# Patient Record
Sex: FEMALE | Race: WHITE | Employment: OTHER | URBAN - METROPOLITAN AREA
[De-identification: names, ages, dates, MRNs, and addresses within clinical notes are randomized per-mention and may not be internally consistent; named-entity substitution may affect disease eponyms.]

---

## 2020-06-06 ENCOUNTER — OFFICE VISIT (OUTPATIENT)
Dept: URGENT CARE | Facility: URGENT CARE | Age: 75
End: 2020-06-06
Payer: MEDICARE

## 2020-06-06 VITALS — HEART RATE: 81 BPM | DIASTOLIC BLOOD PRESSURE: 83 MMHG | SYSTOLIC BLOOD PRESSURE: 160 MMHG | TEMPERATURE: 98.7 F

## 2020-06-06 DIAGNOSIS — H10.33 ACUTE BACTERIAL CONJUNCTIVITIS OF BOTH EYES: Primary | ICD-10-CM

## 2020-06-06 DIAGNOSIS — R20.0 NUMBNESS OF RIGHT HAND: ICD-10-CM

## 2020-06-06 DIAGNOSIS — Z91.030 ALLERGY TO BEE STING: ICD-10-CM

## 2020-06-06 PROCEDURE — 99203 OFFICE O/P NEW LOW 30 MIN: CPT | Performed by: FAMILY MEDICINE

## 2020-06-06 RX ORDER — EPINEPHRINE 0.3 MG/.3ML
0.3 INJECTION SUBCUTANEOUS
Qty: 2 EACH | Refills: 1 | Status: SHIPPED | OUTPATIENT
Start: 2020-06-06

## 2020-06-06 RX ORDER — NEOMYCIN POLYMYXIN B SULFATES AND DEXAMETHASONE 3.5; 10000; 1 MG/ML; [USP'U]/ML; MG/ML
SUSPENSION/ DROPS OPHTHALMIC
Qty: 5 ML | Refills: 0 | Status: SHIPPED | OUTPATIENT
Start: 2020-06-06

## 2020-06-06 NOTE — PATIENT INSTRUCTIONS
Use eye drops as directed.    Take antihistamine like generic Benadryl or generic Claritin or generic Zyrtec.    Warm pack with warm wash cloth.    See Eye Doctor if not improving.    See your Doctor for a check.

## 2020-06-06 NOTE — PROGRESS NOTES
CHIEF COMPLAINT    Eyes red, mattering      HISTORY    Symptomatic for 4 days. Notices redness, white-yellow drainage, some lid swelling. milld tenderness.    Has h/o macular degeneration R eye.    Needs refill of Epi Pen. Used for insect sting reaction.    Also notices a feeling in R hand like a film.    Hasn't seen her doctor recently.      Patient Active Problem List   Diagnosis     Essential hypertension     Bilateral low back pain without sciatica     Blood glucose elevated     Hyperlipidemia LDL goal <130     Non morbid obesity, unspecified obesity type       Current Outpatient Medications   Medication Sig Dispense Refill     EPINEPHrine (EPIPEN 2-JACIEL) 0.3 MG/0.3ML injection 2-pack Inject 0.3 mLs (0.3 mg) into the muscle once as needed for anaphylaxis 2 each 1     Multiple Vitamin (MULTIVITAMIN PO) Take 1 tablet by mouth daily       naproxen sodium (ANAPROX) 220 MG tablet Take 1 tablet (220 mg) by mouth 2 times daily (with meals)       neomycin-polymixin-dexamethasone (MAXITROL) 0.1 % ophthalmic suspension 2 drops in each eye 4 times daily 5 mL 0       REVIEW OF SYSTEMS    No fever or malaise  No HA  No cough   No nausea or diarrhea  No rash      Past Medical History:   Diagnosis Date     Bilateral low back pain without sciatica 1/31/2016     Blood glucose elevated 1/31/2016     Dislocation of left shoulder joint 2010     Essential hypertension 1/31/2016     Hyperlipidemia LDL goal <130 1/31/2016     Non morbid obesity, unspecified obesity type 3/19/2016     Rheumatic fever age 15     Tobacco abuse     1.5ppd       EXAM  BP (!) 160/83   Pulse 81   Temp 98.7  F (37.1  C) (Tympanic)     NAD, alert  Redness of conjunctiva, white-yellow drainage, mild lid swelling bilat  Neck no adenopathy  Non labored resp  No edema  R hand neg Tinel      (H10.33) Acute bacterial conjunctivitis of both eyes  (primary encounter diagnosis)  Comment:   Plan: neomycin-polymixin-dexamethasone (MAXITROL) 0.1        % ophthalmic  suspension            (Z91.030) Allergy to bee sting  Comment: refill  Plan: EPINEPHrine (EPIPEN 2-JACIEL) 0.3 MG/0.3ML         injection 2-pack            (R20.0) Numbness of right hand  Comment:   May had CTS  Plan:   Best to F/U with her doctor.

## 2021-03-17 ENCOUNTER — IMMUNIZATION (OUTPATIENT)
Dept: NURSING | Facility: CLINIC | Age: 76
End: 2021-03-17
Payer: MEDICARE

## 2021-03-17 PROCEDURE — 91300 PR COVID VAC PFIZER DIL RECON 30 MCG/0.3 ML IM: CPT

## 2021-03-17 PROCEDURE — 0001A PR COVID VAC PFIZER DIL RECON 30 MCG/0.3 ML IM: CPT

## 2021-04-07 ENCOUNTER — IMMUNIZATION (OUTPATIENT)
Dept: NURSING | Facility: CLINIC | Age: 76
End: 2021-04-07
Attending: INTERNAL MEDICINE
Payer: MEDICARE

## 2021-04-07 PROCEDURE — 91300 PR COVID VAC PFIZER DIL RECON 30 MCG/0.3 ML IM: CPT

## 2021-04-07 PROCEDURE — 0002A PR COVID VAC PFIZER DIL RECON 30 MCG/0.3 ML IM: CPT

## 2021-12-20 ENCOUNTER — IMMUNIZATION (OUTPATIENT)
Dept: NURSING | Facility: CLINIC | Age: 76
End: 2021-12-20
Payer: MEDICARE

## 2021-12-20 PROCEDURE — 91300 PR COVID VAC PFIZER DIL RECON 30 MCG/0.3 ML IM: CPT

## 2021-12-20 PROCEDURE — 0004A PR COVID VAC PFIZER DIL RECON 30 MCG/0.3 ML IM: CPT
